# Patient Record
Sex: MALE | Race: BLACK OR AFRICAN AMERICAN | URBAN - METROPOLITAN AREA
[De-identification: names, ages, dates, MRNs, and addresses within clinical notes are randomized per-mention and may not be internally consistent; named-entity substitution may affect disease eponyms.]

---

## 2024-11-25 ENCOUNTER — OFFICE VISIT (OUTPATIENT)
Facility: LOCATION | Age: 79
End: 2024-11-25
Payer: MEDICAID

## 2024-11-25 DIAGNOSIS — H90.3 SENSORINEURAL HEARING LOSS (SNHL) OF BOTH EARS: Primary | ICD-10-CM

## 2024-11-25 PROCEDURE — 99204 OFFICE O/P NEW MOD 45 MIN: CPT | Performed by: OTOLARYNGOLOGY

## 2024-11-25 NOTE — PROGRESS NOTES
Jennifer Allen is a 79 year old male.   Chief Complaint   Patient presents with    Hearing Loss     HPI:   79-year-old white male has had gradual decrease in hearing no otorrhea otalgia vertigo comes in for medical clearance to get hearing aids.  No current outpatient medications on file.      History reviewed. No pertinent past medical history.   Social History:     Social Drivers of Health     Financial Resource Strain: Not on File (2023)    Received from [employer]    Financial Resource Strain     Financial Resource Strain: 0   Food Insecurity: Not on File (2023)    Received from [employer]    Food Insecurity     Food: 0   Transportation Needs: Not on File (2023)    Received from [employer]    Transportation Needs     Transportation: 0   Physical Activity: Not on File (2023)    Received from [employer]    Physical Activity     Physical Activity: 0   Stress: Not on File (2023)    Received from [employer]    Stress     Stress: 0   Social Connections: Not on File (2024)    Received from [employer]    Social Connections     Connectedness: 0   Housing Stability: Not on File (2023)    Received from [employer]    Housing Stability     Housin      History reviewed. No pertinent surgical history.      REVIEW OF SYSTEMS:   GENERAL HEALTH: feels well otherwise  GENERAL : denies fever, chills, sweats, weight loss, weight gain  SKIN: denies any unusual skin lesions or rashes  RESPIRATORY: denies shortness of breath with exertion  NEURO: denies headaches    EXAM:   There were no vitals taken for this visit.    System Pertinent findings Details   Constitutional  Overall appearance - Normal.   Psychiatric  Orientation - Oriented to time, place, person & situation. Appropriate mood and affect.   Head/Face  Facial features -- Normal. Skull - Normal.   Eyes  Pupils equal ,round ,react to light and accomidate   Ears  External Ear Right: Normal, Left: Normal. Canal - Right: Normal, Left: Normal. TM - Right: Normal left: Normal   Nose   External Nose, Normal, Septum -midline nasal Vault, clear,Turbinates - Right: Normal left: Normal   Mouth/Throat  Lips/teeth/gums - Normal. Tonsils -1+. Oropharynx - Normal.   Neck Exam  Inspection - Normal. Palpation - Normal. Parotid gland - Normal. Thyroid gland -normal   Neurological  Cranial nerves - Cranial nerves II through XII grossly intact.   Nasopharynx   Normal        Lymph Detail  Submental. Submandibular. Anterior cervical. Posterior cervical. Supraclavicular.   Audiogram from [facility] shows downsloping high-frequency sensorineural hearing loss slight asymmetry left on the right    ASSESSMENT AND PLAN:   1. Sensorineural hearing loss (SNHL) of both ears  Patient is medically cleared for hearing aids bilaterally  Patient was given a note to this effect  Is recommended a 1 year follow-up audiogram to check for progression        The patient indicates understanding of these issues and agrees to the plan.      Jarad Kessler MD  11/25/2024  4:51 PM

## (undated) NOTE — LETTER
24          Jennifer Allen  :  3/1/1945      To Whom It May Concern:    This patient was seen in our office on 24. Patient is cleared for bilateral hearing aids. If this office may be of further assistance, please do not hesitate to contact us. [phone]      Sincerely,        Jarad Kessler MD